# Patient Record
Sex: MALE | Race: WHITE
[De-identification: names, ages, dates, MRNs, and addresses within clinical notes are randomized per-mention and may not be internally consistent; named-entity substitution may affect disease eponyms.]

---

## 2020-04-25 NOTE — NUR
PATIENT CAME TO ER BED 2 C/O RIGHT HAND LACERATION. PATIENT STATES HE WAS 
TRYING TO CATCH A FALLING GLASS CUP WHEN IT HAD SHATTERED ON HIS. RIGHT 4TH 
DIGIT AVULSION ALONG WITH PALM LACERATIONS. AAOX4. NO SOB. BREATHING EVENLY AND 
UNLABORED ON ROOM AIR. CONNECTED TO MONITOR.

## 2020-04-26 NOTE — NUR
Patient discharged to home in stable condition. Written and verbal after care 
instructions given. Patient verbalizes understanding of instruction. pt 
instructed not to drive. pt verbalized understanding

## 2020-05-04 ENCOUNTER — HOSPITAL ENCOUNTER (EMERGENCY)
Dept: HOSPITAL 54 - ER | Age: 70
Discharge: HOME | End: 2020-05-04
Payer: MEDICARE

## 2020-05-04 VITALS — BODY MASS INDEX: 24.38 KG/M2 | WEIGHT: 180 LBS | HEIGHT: 72 IN

## 2020-05-04 VITALS — SYSTOLIC BLOOD PRESSURE: 148 MMHG | DIASTOLIC BLOOD PRESSURE: 88 MMHG

## 2020-05-04 DIAGNOSIS — S61.411D: Primary | ICD-10-CM
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
## 2020-05-04 DIAGNOSIS — S61.214D: ICD-10-CM

## 2024-10-02 ENCOUNTER — HOSPITAL ENCOUNTER (EMERGENCY)
Dept: HOSPITAL 54 - ER | Age: 74
Discharge: HOME | End: 2024-10-02
Payer: MEDICARE

## 2024-10-02 VITALS — BODY MASS INDEX: 28.44 KG/M2 | WEIGHT: 210 LBS | HEIGHT: 72 IN

## 2024-10-02 VITALS — TEMPERATURE: 98.1 F | OXYGEN SATURATION: 98 % | DIASTOLIC BLOOD PRESSURE: 68 MMHG | SYSTOLIC BLOOD PRESSURE: 131 MMHG

## 2024-10-02 DIAGNOSIS — H10.9: Primary | ICD-10-CM
